# Patient Record
Sex: MALE | Race: OTHER
[De-identification: names, ages, dates, MRNs, and addresses within clinical notes are randomized per-mention and may not be internally consistent; named-entity substitution may affect disease eponyms.]

---

## 2017-10-23 ENCOUNTER — HOSPITAL ENCOUNTER (OUTPATIENT)
Dept: HOSPITAL 76 - LAB.S | Age: 42
Discharge: HOME | End: 2017-10-23
Attending: NURSE PRACTITIONER
Payer: SELF-PAY

## 2017-10-23 DIAGNOSIS — R80.9: Primary | ICD-10-CM

## 2017-10-23 DIAGNOSIS — R79.89: ICD-10-CM

## 2017-10-23 DIAGNOSIS — R30.0: ICD-10-CM

## 2017-10-23 LAB
ALBUMIN/GLOB SERPL: 1.5 {RATIO} (ref 1–2.2)
ANION GAP SERPL CALCULATED.4IONS-SCNC: 7 MMOL/L (ref 6–13)
BILIRUB BLD-MCNC: 1 MG/DL (ref 0.2–1)
BUN SERPL-MCNC: 24 MG/DL (ref 6–20)
CALCIUM UR-MCNC: 8.7 MG/DL (ref 8.5–10.3)
CHLORIDE SERPL-SCNC: 101 MMOL/L (ref 101–111)
CO2 SERPL-SCNC: 28 MMOL/L (ref 21–32)
CREAT SERPLBLD-SCNC: 0.9 MG/DL (ref 0.6–1.2)
GFRSERPLBLD MDRD-ARVRAT: 93 ML/MIN/{1.73_M2} (ref 89–?)
GLOBULIN SER-MCNC: 2.9 G/DL (ref 2.1–4.2)
GLUCOSE SERPL-MCNC: 106 MG/DL (ref 70–100)
POTASSIUM SERPL-SCNC: 3.7 MMOL/L (ref 3.5–5)
PROT SPEC-MCNC: 7.3 G/DL (ref 6.7–8.2)
SODIUM SERPLBLD-SCNC: 136 MMOL/L (ref 135–145)

## 2017-10-23 PROCEDURE — 36415 COLL VENOUS BLD VENIPUNCTURE: CPT

## 2017-10-23 PROCEDURE — 80053 COMPREHEN METABOLIC PANEL: CPT

## 2017-10-23 PROCEDURE — 87491 CHLMYD TRACH DNA AMP PROBE: CPT

## 2017-10-23 PROCEDURE — 87591 N.GONORRHOEAE DNA AMP PROB: CPT

## 2018-08-07 ENCOUNTER — HOSPITAL ENCOUNTER (EMERGENCY)
Dept: HOSPITAL 76 - ED | Age: 43
Discharge: HOME | End: 2018-08-07
Payer: SELF-PAY

## 2018-08-07 VITALS — SYSTOLIC BLOOD PRESSURE: 135 MMHG | DIASTOLIC BLOOD PRESSURE: 89 MMHG
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
## 2018-08-07 DIAGNOSIS — S20.211A: ICD-10-CM
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
## 2018-08-07 DIAGNOSIS — S06.0X0A: Primary | ICD-10-CM

## 2018-08-07 PROCEDURE — 70450 CT HEAD/BRAIN W/O DYE: CPT

## 2018-08-07 PROCEDURE — 71046 X-RAY EXAM CHEST 2 VIEWS: CPT

## 2018-08-07 PROCEDURE — 99283 EMERGENCY DEPT VISIT LOW MDM: CPT

## 2018-08-07 NOTE — XRAY REPORT
Procedure Date:  08/07/2018   

Accession Number:  [id number] / [id number]                    

Procedure:  XR  - Chest 2 View X-Ray CPT Code:  49551

 

FULL RESULT:

 

 

EXAM:

CHEST RADIOGRAPHY

 

EXAM DATE: 8/7/2018 08:26 PM.

 

CLINICAL HISTORY: Chest pain right sided;  fell from tree.

 

COMPARISON: 02/22/2016.

 

TECHNIQUE: 2 views.

 

FINDINGS:

Lungs/Pleura: No focal opacities evident. No pleural effusion. No 

pneumothorax. Normal volumes.

 

Mediastinum: Heart and mediastinal contours are normal.

 

Other: None.

IMPRESSION: No acute cardiopulmonary abnormality.

 

[facility]

## 2018-08-07 NOTE — ED PHYSICIAN DOCUMENTATION
PD HPI HEAD INJURY





- Stated complaint


Stated Complaint: FELL FROM TREE/HIT HEAD





- Chief complaint


Chief Complaint: Trauma Hd/Nk





- History obtained from


History obtained from: Patient, Friend





- History of Present Illness


Mechanism of head injury: Fell (he slid/fell down tree about 10 feet couple 

days ago. Has abrasions on right thigh and arm. Has some tenderness in right 

chest. Main is that he has some headache and tenderness right side, and does 

not remember the injury and is acting slightly off from usual, per the person 

with him. Concern for head injury. No visual changes. No vomiting, but did have 

some nausea.)


Where head injury occurred: Work


Timing - onset: How many days ago (2)


Location of injury: Right, Front


Associated symptoms: AMS (slightly slower interacting the past couple days, per 

friend with him.), Amnesia, Nausea / vomiting.  No: LOC


Symptoms worsen with: Palpation


Contributing factors: No: Anticoagulated, Intoxicated


Similar symptoms before: Has not had sx before


Recently seen: Not recently seen





Review of Systems


Constitutional: denies: Fever


Eyes: denies: Loss of vision, Decreased vision, Photophobia


Nose: denies: Rhinorrhea / runny nose, Congestion


Throat: denies: Sore throat


Cardiac: reports: Chest pain / pressure (right anterolateral lower chest).  

denies: Palpitations, Pedal edema, Calf pain


Respiratory: denies: Dyspnea, Cough


GI: denies: Abdominal Pain, Nausea, Vomiting


: denies: Dysuria, Frequency


Skin: reports: Abrasion (s)


Musculoskeletal: denies: Neck pain, Back pain


Neurologic: reports: Altered mental status, Headache, Head injury.  denies: 

Generalized weakness, Focal weakness, Numbness, Difficulty speaking





PD PAST MEDICAL HISTORY





- Past Medical History


Cardiovascular: None


Respiratory: None


Neuro: None


Endocrine/Autoimmune: None





- Present Medications


Home Medications: 


 Ambulatory Orders











 Medication  Instructions  Recorded  Confirmed


 


Tramadol HCl 50 mg PO Q6H PRN #20 tablet 08/07/18 














- Allergies


Allergies/Adverse Reactions: 


 Allergies











Allergy/AdvReac Type Severity Reaction Status Date / Time


 


No Known Drug Allergies Allergy   Verified 08/07/18 16:44














PD ED PE NORMAL





- Vitals


Vital signs reviewed: Yes





- General


General: Alert and oriented X 3, No acute distress, Well developed/nourished





- HEENT


HEENT: PERRL, EOMI, Pharynx benign, Dentition benign, Other (right lateral head 

with mild tenderness. No swelling nor deformity. )





- Neck


Neck: Supple, no meningeal sign, No bony TTP, No adenopathy





- Cardiac


Cardiac: RRR, No murmur





- Respiratory


Respiratory: No respiratory distress, Clear bilaterally, Other (right lower 

ribs anterolaterally with tenderness but no obvious deformity. )





- Abdomen


Abdomen: Soft, Non tender





- Back


Back: No spinal TTP





- Derm


Derm: Normal color, Warm and dry





- Extremities


Extremities: No deformity, Other (right thigh and forearm with some abrasions 

and ecchymosis without bony tenderness nor decreased ROM. )





- Neuro


Neuro: Alert and oriented X 3, No motor deficit, No sensory deficit, Normal 

speech


Eye Opening: Spontaneous


Motor: Obeys Commands


Verbal: Oriented


GCS Score: 15





Results





- Vitals


Vitals: 


 Oxygen











O2 Source                      Room air

















- Rads (name of study)


  ** head CT


Radiology: Prelim report reviewed (normal), EMP read contemporaneously





  ** chest xray


Radiology: Prelim report reviewed (no acute injury seen)





PD MEDICAL DECISION MAKING





- ED course


Complexity details: reviewed results, considered differential (mild concussive 

symptoms. Will get head CT. Has abrasions amrs and chest, but no signs of 

fractures. There is tenderness right chestwall, so get xray there. ), d/w 

patient





- Sepsis Event


Vital Signs: 


 Oxygen











O2 Source                      Room air

















Departure





- Departure


Disposition: 01 Home, Self Care


Clinical Impression: 


Fall from tree


Qualifiers:


 Encounter type: initial encounter Qualified Code(s): W14.XXXA - Fall from tree

, initial encounter





Mild concussion


Qualifiers:


 Encounter type: initial encounter Loss of consciousness presence/duration: 

without LOC Qualified Code(s): S06.0X0A - Concussion without loss of 

consciousness, initial encounter





Chest wall contusion


Qualifiers:


 Encounter type: initial encounter Laterality: right Qualified Code(s): 

S20.211A - Contusion of right front wall of thorax, initial encounter





Condition: Stable


Record reviewed to determine appropriate education?: Yes


Instructions:  ED Concussion, ED Contusion Chest Wall


Follow-Up: 


Stephania Aguirre ARNP [Primary Care Provider] - 


Prescriptions: 


Tramadol HCl 50 mg PO Q6H PRN #20 tablet


 PRN Reason: Pain


Comments: 


Rest with little work but light activity for 1 or 2 days until improved.  

Ibuprofen 400 600 mg 3 times a day.  Add Tylenol and/or tramadol if needed for 

pains.  Recheck if not improving over the next few days.


Forms:  Activity restrictions


Discharge Date/Time: 08/07/18 21:12

## 2019-10-18 ENCOUNTER — HOSPITAL ENCOUNTER (OUTPATIENT)
Dept: HOSPITAL 76 - LAB.R | Age: 44
Discharge: HOME | End: 2019-10-18
Attending: NURSE PRACTITIONER
Payer: SELF-PAY

## 2019-10-18 DIAGNOSIS — R35.0: Primary | ICD-10-CM

## 2019-10-18 DIAGNOSIS — R30.0: ICD-10-CM

## 2019-10-18 PROCEDURE — 87661 TRICHOMONAS VAGINALIS AMPLIF: CPT

## 2019-10-18 PROCEDURE — 87591 N.GONORRHOEAE DNA AMP PROB: CPT

## 2019-10-18 PROCEDURE — 87491 CHLMYD TRACH DNA AMP PROBE: CPT

## 2019-10-18 PROCEDURE — 87086 URINE CULTURE/COLONY COUNT: CPT

## 2019-10-19 LAB — T VAGINALIS RRNA GENITAL QL PROBE: NEGATIVE

## 2019-10-28 ENCOUNTER — HOSPITAL ENCOUNTER (OUTPATIENT)
Dept: HOSPITAL 76 - LAB.WCP | Age: 44
Discharge: HOME | End: 2019-10-28
Attending: PHYSICIAN ASSISTANT
Payer: SELF-PAY

## 2019-10-28 DIAGNOSIS — Z20.2: Primary | ICD-10-CM

## 2019-10-28 PROCEDURE — 86592 SYPHILIS TEST NON-TREP QUAL: CPT

## 2019-10-28 PROCEDURE — 87661 TRICHOMONAS VAGINALIS AMPLIF: CPT

## 2019-10-28 PROCEDURE — 86696 HERPES SIMPLEX TYPE 2 TEST: CPT

## 2019-10-28 PROCEDURE — 81599 UNLISTED MAAA: CPT

## 2019-10-28 PROCEDURE — 36415 COLL VENOUS BLD VENIPUNCTURE: CPT

## 2019-10-28 PROCEDURE — 87491 CHLMYD TRACH DNA AMP PROBE: CPT

## 2019-10-28 PROCEDURE — 87591 N.GONORRHOEAE DNA AMP PROB: CPT

## 2019-10-28 PROCEDURE — 86695 HERPES SIMPLEX TYPE 1 TEST: CPT

## 2019-10-28 PROCEDURE — 87389 HIV-1 AG W/HIV-1&-2 AB AG IA: CPT

## 2019-10-29 LAB — HIV AG/AB 4TH GEN: (no result)

## 2019-10-30 LAB
HSV 1 IGG TYPE SPECIFIC AB: <0.9 INDEX
HSV 2 IGG TYPE SPECIFIC AB: <0.9 INDEX

## 2019-11-08 ENCOUNTER — HOSPITAL ENCOUNTER (OUTPATIENT)
Dept: HOSPITAL 76 - LAB.R | Age: 44
Discharge: HOME | End: 2019-11-08
Attending: PHYSICIAN ASSISTANT
Payer: SELF-PAY

## 2019-11-08 DIAGNOSIS — Z20.2: Primary | ICD-10-CM

## 2019-11-08 PROCEDURE — 87591 N.GONORRHOEAE DNA AMP PROB: CPT

## 2019-11-08 PROCEDURE — 87491 CHLMYD TRACH DNA AMP PROBE: CPT

## 2019-11-08 PROCEDURE — 87661 TRICHOMONAS VAGINALIS AMPLIF: CPT

## 2019-11-09 LAB — T VAGINALIS RRNA GENITAL QL PROBE: NEGATIVE

## 2020-01-15 ENCOUNTER — HOSPITAL ENCOUNTER (OUTPATIENT)
Dept: HOSPITAL 76 - LAB.WCP | Age: 45
Discharge: HOME | End: 2020-01-15
Attending: FAMILY MEDICINE
Payer: SELF-PAY

## 2020-01-15 DIAGNOSIS — K13.0: ICD-10-CM

## 2020-01-15 DIAGNOSIS — Z20.2: Primary | ICD-10-CM

## 2020-01-15 PROCEDURE — 87389 HIV-1 AG W/HIV-1&-2 AB AG IA: CPT

## 2020-01-15 PROCEDURE — 86695 HERPES SIMPLEX TYPE 1 TEST: CPT

## 2020-01-15 PROCEDURE — 81599 UNLISTED MAAA: CPT

## 2020-01-15 PROCEDURE — 36415 COLL VENOUS BLD VENIPUNCTURE: CPT

## 2020-01-15 PROCEDURE — 86696 HERPES SIMPLEX TYPE 2 TEST: CPT

## 2020-01-16 LAB — HIV AG/AB 4TH GEN: (no result)

## 2020-01-17 LAB
HSV 1 IGG TYPE SPECIFIC AB: <0.9 INDEX
HSV 2 IGG TYPE SPECIFIC AB: <0.9 INDEX